# Patient Record
Sex: FEMALE | Race: WHITE
[De-identification: names, ages, dates, MRNs, and addresses within clinical notes are randomized per-mention and may not be internally consistent; named-entity substitution may affect disease eponyms.]

---

## 2018-09-01 ENCOUNTER — HOSPITAL ENCOUNTER (EMERGENCY)
Dept: HOSPITAL 39 - ER | Age: 48
Discharge: HOME | End: 2018-09-01
Payer: COMMERCIAL

## 2018-09-01 VITALS — DIASTOLIC BLOOD PRESSURE: 84 MMHG | OXYGEN SATURATION: 100 % | SYSTOLIC BLOOD PRESSURE: 115 MMHG

## 2018-09-01 VITALS — TEMPERATURE: 96.9 F

## 2018-09-01 DIAGNOSIS — K21.9: ICD-10-CM

## 2018-09-01 DIAGNOSIS — R07.2: Primary | ICD-10-CM

## 2018-09-01 DIAGNOSIS — Z79.82: ICD-10-CM

## 2018-09-01 PROCEDURE — 85379 FIBRIN DEGRADATION QUANT: CPT

## 2018-09-01 PROCEDURE — 82150 ASSAY OF AMYLASE: CPT

## 2018-09-01 PROCEDURE — 83690 ASSAY OF LIPASE: CPT

## 2018-09-01 PROCEDURE — 85730 THROMBOPLASTIN TIME PARTIAL: CPT

## 2018-09-01 PROCEDURE — 83735 ASSAY OF MAGNESIUM: CPT

## 2018-09-01 PROCEDURE — 84443 ASSAY THYROID STIM HORMONE: CPT

## 2018-09-01 PROCEDURE — 80053 COMPREHEN METABOLIC PANEL: CPT

## 2018-09-01 PROCEDURE — 85025 COMPLETE CBC W/AUTO DIFF WBC: CPT

## 2018-09-01 PROCEDURE — 82550 ASSAY OF CK (CPK): CPT

## 2018-09-01 PROCEDURE — 83880 ASSAY OF NATRIURETIC PEPTIDE: CPT

## 2018-09-01 PROCEDURE — 85610 PROTHROMBIN TIME: CPT

## 2018-09-01 PROCEDURE — 93005 ELECTROCARDIOGRAM TRACING: CPT

## 2018-09-01 PROCEDURE — 82553 CREATINE MB FRACTION: CPT

## 2018-09-01 PROCEDURE — 74019 RADEX ABDOMEN 2 VIEWS: CPT

## 2018-09-01 PROCEDURE — 84484 ASSAY OF TROPONIN QUANT: CPT

## 2018-09-01 PROCEDURE — 81001 URINALYSIS AUTO W/SCOPE: CPT

## 2018-09-01 NOTE — ED.PDOC
History of Present Illness





- General


Source: patient


Exam Limitations: no limitations





- History of Present Illness


Initial Comments: 





the patient is a 48-year-old  female presenting to the emergency room 

secondary to intermittent substernal chest pain that is more burning in nature.

  The patient does have a history of significant gastroesophageal reflux 

disease with apparently gastritis and esophagitis as diagnosed by recent upper 

endoscopy.  She had gone off of her Protonix for about a week when she started 

having some substernal chest pain.  Chest pain has been intermittent.  She says 

that sometimes she thinks it is brought on by activity but other times not.  No 

real nausea or vomiting.  No syncope.  Sometimes she thinks she has some 

palpitations.  No history of any heart problems.  No new medications.  She does 

take a daily Excedrin.  Symptoms got worse tonight as she was going to the 

football game.


Timing/Duration: unsure, intermittent


Severity: moderate


Improving Factors: nothing


Worsening Factors: nothing


Associated Symptoms: chest pain, malaise





<Reginald Vila - Last Filed: 09/01/18 06:36>





<Rob Mejia - Last Filed: 09/01/18 09:30>





- General


Chief Complaint: Chest Pain/MI


Stated Complaint: shortness of breath and chest pain


Time Seen by Provider: 09/01/18 03:24





- History of Present Illness


Allergies/Adverse Reactions: 


Allergies





NO KNOWN ALLERGY Allergy (Verified 09/01/18 03:35)


 








Home Medications: 


Ambulatory Orders





Aspirin [Umer Low Dose] 81 mg PO DAILY #1 bottle 09/01/18 


Nitroglycerin 0.4 mg Tab [Nitrostat] 1 ea SL .Q5M PRN #1 bottle 09/01/18 


Pantoprazole Sodium 40 mg PO DAILY 09/01/18 











Review of Systems





- Review of Systems


Constitutional: States: no symptoms reported


EENTM: States: no symptoms reported


Respiratory: States: short of breath - mild subjective


Cardiology: States: chest pain, palpitations


Gastrointestinal/Abdominal: States: see HPI


Genitourinary: States: no symptoms reported


Musculoskeletal: States: no symptoms reported


Skin: States: no symptoms reported


Neurological: States: anxiety


Endocrine: States: no symptoms reported


All other Systems: No Change from Baseline





<Reginald Vila - Last Filed: 09/01/18 06:36>





Past Medical History (General)





- Patient Medical History


Hx Asthma: Yes


Hx Gastroesophageal Reflux: Yes


Surgical History: Hysterectomy, other





- Vaccination History


Hx Tetanus, Diphtheria Vaccination: Yes


Hx Influenza Vaccination: No


Hx Pneumococcal Vaccination: No





- Social History


Hx Tobacco Use: No


Hx Alcohol Use: Yes - occ


Hx Substance Use: No


Hx Substance Use Treatment: No


Hx Depression: No





- Female History


Patient is a Female of Child Bearing Age (10 -59 yrs old): Yes


Patient Pregnant: No





<Reginald Vila L - Last Filed: 09/01/18 06:36>





- Patient Medical History


Surgical History: other - colonoscopy





<Andrew Mejiao R - Last Filed: 09/01/18 09:30>





Family Medical History





- Family History


  ** Mother


Family History: Unknown





<Reginald Vila L - Last Filed: 09/01/18 06:36>





- Family History


  ** Mother


Hx Family;Other: colon polyp--dad





<Rob Mejia R - Last Filed: 09/01/18 09:30>





Physical Exam





- Physical Exam


General Appearance: Alert, Anxious, No apparent distress


Eye Exam: bilateral normal


Ears, Nose, Throat: hearing grossly normal, normal ENT inspection, normal 

pharynx


Neck: non-tender, full range of motion, supple


Respiratory: lungs clear, normal breath sounds, no respiratory distress, no 

accessory muscle use


Cardiovascular/Chest: normal peripheral pulses, regular rate, rhythm, no edema


Peripheral Pulses: radial,right: 2+, radial,left: 2+, dorsalis pedis,right: 2+, 

dorsalis pedis,left: 2+


Gastrointestinal/Abdominal: non tender, soft


Rectal Exam: deferred


Back Exam: normal inspection, no CVA tenderness


Extremity: normal range of motion, non-tender, normal inspection, no pedal edema

, normal capillary refill


Neurologic: CNs II-XII nml as tested, no motor/sensory deficits, alert, 

oriented x 3


Skin Exam: normal color


Comments: 





 Vital Signs - 24 hr











  09/01/18 09/01/18 09/01/18





  03:24 03:33 04:24


 


Temperature 96.9 F L  96.9 F L


 


Pulse Rate [ 83 100 H 75





left]   


 


Respiratory 18 16 16





Rate   


 


Blood Pressure 159/90  131/75





[left]   


 


O2 Sat by Pulse 97  99





Oximetry   














  09/01/18 09/01/18





  05:36 06:35


 


Temperature 96.9 F L 


 


Pulse Rate [ 77 81





left]  


 


Respiratory 16 16





Rate  


 


Blood Pressure 120/72 119/73





[left]  


 


O2 Sat by Pulse 92 L 98





Oximetry  














<Reginald Vila MITZY - Last Filed: 09/01/18 06:36>





Progress





- Progress


Progress: 





09/01/18 06:41


the patient is a 48-year-old  female with a known history of gastritis 

and esophagitis as confirmed by recent EGD according to her presenting with 

substernal chest pain that occurs at varying times.  The patient seems to 

respond to GI medications here.  She is resting comfortably currently.  The 

patient will need at least 1 more set of cardiac enzymes and depending on what 

her symptoms do, may require a more extensive rule out or further cardiac risk 

stratification.  she has received an aspirin.  She is not requiring any oxygen.

  She has not required any morphine or any nitroglycerin to this point.  She 

has not received the Lovenox as this may worsen the known gastritis issue.  As 

a 48-year-old female she is not without cardiac risk and certainly symptoms may 

present an atypical fashion and this demographic.  Continue monitoring and 

obtain repeat cardiac enzymes.  Management will be assumed by oncoming 

physician.





- Results/Orders


Results/Orders: 





chest x-ray shows no acute abnormalities.  Abdominal x-ray shows no acute 

abnormalities. 





09/01/18 03:32


Telemetry .CONTINUOUS normal sinus rhythm





09/01/18 03:45


EKG STAT normal sinus rhythm at 78 bpm.  Normal axis.  No acute ST or T-wave 

changes concerning for current ischemia.  Mild slow R-wave progression in 

anterior leads.  Normal QT corrected interval.








 Laboratory Results - last 24 hr











  09/01/18 09/01/18 09/01/18





  03:33 03:33 03:33


 


WBC   7.7 


 


RBC   4.43 


 


Hgb   13.3 


 


Hct   39.3 


 


MCV   88.8 


 


MCH   30.0 


 


MCHC   33.8 


 


RDW   13.7 


 


Plt Count   240 


 


MPV   8.7 


 


Absolute Neuts (auto)   4.50 


 


Absolute Lymphs (auto)   2.50 


 


Absolute Monos (auto)   0.60 


 


Absolute Eos (auto)   0.10 


 


Absolute Basos (auto)   0.00 


 


Neutrophils %   58.0 


 


Lymphocytes %   32.2 


 


Monocytes %   8.0 


 


Eosinophils %   1.3 


 


Basophils %   0.5 


 


PT    10.0


 


INR    1.00


 


PTT (SP)    28.3


 


D-Dimer, Quantitative    0.25


 


Sodium  142  


 


Potassium  4.2  


 


Chloride  108  


 


Carbon Dioxide  27  


 


Anion Gap  11.2 L  


 


BUN  14  


 


Creatinine  0.64  


 


BUN/Creatinine Ratio  21.9 H  


 


Random Glucose  104  


 


Serum Osmolality  283.9  


 


Calcium  9.5  


 


Magnesium  2.0  


 


Total Bilirubin  0.2  


 


AST  25  


 


ALT  32  


 


Alkaline Phosphatase  76  


 


Creatine Kinase  63  


 


CK-MB (CK-2)  0.9  


 


CK-MB (CK-2) %  Not Reportable  


 


Troponin I  < 0.02  


 


B-Natriuretic Peptide  < 5.0  


 


Serum Total Protein  7.5  


 


Albumin  4.5  


 


Globulin  3.0  


 


Albumin/Globulin Ratio  1.5  


 


Amylase  39  


 


Lipase  22  


 


TSH  9.01 H  


 


Urine Color   


 


Urine Appearance   


 


Urine pH   


 


Ur Specific Gravity   


 


Urine Protein   


 


Urine Glucose (UA)   


 


Urine Ketones   


 


Urine Blood   


 


Urine Nitrite   


 


Urine Bilirubin   


 


Urine Urobilinogen   


 


Ur Leukocyte Esterase   


 


Urine RBC   


 


Urine WBC   


 


Ur Epithelial Cells   


 


Urine Bacteria   














  09/01/18





  03:59


 


WBC 


 


RBC 


 


Hgb 


 


Hct 


 


MCV 


 


MCH 


 


MCHC 


 


RDW 


 


Plt Count 


 


MPV 


 


Absolute Neuts (auto) 


 


Absolute Lymphs (auto) 


 


Absolute Monos (auto) 


 


Absolute Eos (auto) 


 


Absolute Basos (auto) 


 


Neutrophils % 


 


Lymphocytes % 


 


Monocytes % 


 


Eosinophils % 


 


Basophils % 


 


PT 


 


INR 


 


PTT (SP) 


 


D-Dimer, Quantitative 


 


Sodium 


 


Potassium 


 


Chloride 


 


Carbon Dioxide 


 


Anion Gap 


 


BUN 


 


Creatinine 


 


BUN/Creatinine Ratio 


 


Random Glucose 


 


Serum Osmolality 


 


Calcium 


 


Magnesium 


 


Total Bilirubin 


 


AST 


 


ALT 


 


Alkaline Phosphatase 


 


Creatine Kinase 


 


CK-MB (CK-2) 


 


CK-MB (CK-2) % 


 


Troponin I 


 


B-Natriuretic Peptide 


 


Serum Total Protein 


 


Albumin 


 


Globulin 


 


Albumin/Globulin Ratio 


 


Amylase 


 


Lipase 


 


TSH 


 


Urine Color  Yellow


 


Urine Appearance  Clear


 


Urine pH  6.5


 


Ur Specific Gravity  1.015


 


Urine Protein  Negative


 


Urine Glucose (UA)  Negative


 


Urine Ketones  Negative


 


Urine Blood  Negative


 


Urine Nitrite  Negative


 


Urine Bilirubin  Negative


 


Urine Urobilinogen  0.2


 


Ur Leukocyte Esterase  Negative


 


Urine RBC  0


 


Urine WBC  0-1


 


Ur Epithelial Cells  0-1


 


Urine Bacteria  Rare














<Reginald Vila L - Last Filed: 09/01/18 06:36>





- Progress


Progress: 








09/01/18 07:38


Patient stated had same symptoms 15 y/o ago and had negative exercise stress 

test in Pool.She had been taking protonix on and off recently





- Results/Orders


Results/Orders: 





Discuss test result on the patient cardiac enzymes and EKG not showing 

myocardial injury and recommended hospital obs but shey wants to go home check 

with primary md and see heart specialist next week.She was advised to come back 

if symptoms worsens to continue with home medication but was advised also that 

i will prescribed her nitroglycerin tablet sL and to take baby asa until seen 

by cardiologist.





<JudieMarcella danielRivas R - Last Filed: 09/01/18 09:30>





Departure





<Reginald Vila L - Last Filed: 09/01/18 06:36>





- Departure


Time of Disposition: 09:21


Diet: other - Avoid greasy spicy foodas





<JudieMarcella danielRivas R - Last Filed: 09/01/18 09:30>





- Departure


Clinical Impression: 


Chest pain


Qualifiers:


 Chest pain type: unspecified Qualified Code(s): R07.9 - Chest pain, unspecified





Disposition: Discharge to Home or Self Care


Condition: Fair


Instructions:  DI for Chest Pain


Referrals: 


MARICARMEN TAY [Primary Care Provider] - 1-2 Weeks


Prescriptions: 


Aspirin [Umer Low Dose] 81 mg PO DAILY #1 bottle


Nitroglycerin 0.4 mg Tab [Nitrostat] 1 ea SL .Q5M PRN #1 bottle


 PRN Reason: Chest Pain


Home Medications: 


Ambulatory Orders





Aspirin [Umer Low Dose] 81 mg PO DAILY #1 bottle 09/01/18 


Nitroglycerin 0.4 mg Tab [Nitrostat] 1 ea SL .Q5M PRN #1 bottle 09/01/18 


Pantoprazole Sodium 40 mg PO DAILY 09/01/18 








Additional Instructions: 


Return to Emergency room as needed;Follow up with primary Md 04 September 2018 

for referral to cardiologist

## 2018-09-01 NOTE — RAD
PROCEDURE: Abdomen Series



HISTORY: chest pain intermittent for 2 days



COMPARISON: None .



TECHNIQUE:



 Two views of the abdomen and pelvis and one view of the chest,

all projections in the frontal projection.



FINDINGS: 



There are no discrete airspace infiltrates, pneumothoraces or

pleural effusions.



The pulmonary vascularity is normal.



The cardiomediastinal silhouette is within normal limits, for

patient's age and sex.



The bowel gas pattern does not show any evidence of obstruction,

ileus, or bowel wall thickening.



There is no gross evidence of free air in the abdomen or the

pelvis.



There is no visualization of radiopaque calculi in the outline of

the urinary tract.



There is mild-to-moderate constipation .



IMPRESSION:



 There is mild-to-moderate constipation.



There are no acute findings in the chest   



Location of Interpretation: 

[id number]



Electronically signed by:  Charlie Encarnacion MD  9/1/2018 4:05 AM CDT

Workstation: [id number]

## 2020-04-01 ENCOUNTER — HOSPITAL ENCOUNTER (EMERGENCY)
Dept: HOSPITAL 39 - ER | Age: 50
Discharge: HOME | End: 2020-04-01
Payer: COMMERCIAL

## 2020-04-01 VITALS — TEMPERATURE: 98.2 F | SYSTOLIC BLOOD PRESSURE: 134 MMHG | OXYGEN SATURATION: 96 % | DIASTOLIC BLOOD PRESSURE: 100 MMHG

## 2020-04-01 DIAGNOSIS — R05: ICD-10-CM

## 2020-04-01 DIAGNOSIS — Z79.82: ICD-10-CM

## 2020-04-01 DIAGNOSIS — K21.9: ICD-10-CM

## 2020-04-01 DIAGNOSIS — J45.909: ICD-10-CM

## 2020-04-01 DIAGNOSIS — R07.89: Primary | ICD-10-CM

## 2020-04-01 DIAGNOSIS — R06.02: ICD-10-CM

## 2020-04-01 DIAGNOSIS — Z79.899: ICD-10-CM

## 2020-04-01 NOTE — ED.PDOC
History of Present Illness





- General


Chief Complaint: Chest Pain/MI


Stated Complaint: chest pain


Time Seen by Provider: 04/01/20 15:50


Source: patient, RN notes reviewed, Vital Signs reviewed, old records


Exam Limitations: no limitations





- History of Present Illness


Initial Comments: 





Pt is a 50 yo female that presents to ED for 1.5 hour h/o chest tightness, 

palpitations and feeling short of breath after inhaling household chemicals 

while cleaning.  Pt states she was cleaning the house with Lysol and bleach and 

thinks she inhaled some of the fumes.  Her symptoms improved with hgoing outside

and denies any chest tightness or palpitations upon arrival to ED.  Pt also 

states she was in [place] and [place] from March 11-14 and was concerned she 

had COVID 19.  She had a COVID test in [place] on March 14th that was negative.  

She has had mild cough for the past 2 weeks and was seen in [facility] this 

morning and states she had blood work and CXR that were normal.  Denies recent 

fever, NVD.  Denies any h/o cardiac problems.


Allergies/Adverse Reactions: 


Allergies





NO KNOWN ALLERGY Allergy (Verified 09/01/18 03:35)


   





Home Medications: 


Ambulatory Orders





Aspirin [Umer Low Dose] 81 mg PO DAILY #1 bottle 09/01/18 


Pantoprazole Sodium 40 mg PO DAILY 09/01/18 











Review of Systems





- Review of Systems


Constitutional: Denies: chills, fever, weakness


EENTM: Denies: nose congestion, throat pain


Respiratory: States: cough, short of breath.  Denies: wheezing


Cardiology: States: palpitations, other - chest tightness.  Denies: edema, 

syncope


Gastrointestinal/Abdominal: Denies: abdominal pain, diarrhea, nausea, vomiting


Genitourinary: Denies: dysuria, frequency


Musculoskeletal: Denies: back pain, neck pain


Skin: States: no symptoms reported


Neurological: States: no symptoms reported


All other Systems: Reviewed and Negative





Past Medical History (General)





- Patient Medical History


Hx Asthma: Yes


Hx Gastroesophageal Reflux: Yes





- Vaccination History


Hx Tetanus, Diphtheria Vaccination: Yes


Hx Influenza Vaccination: No


Hx Pneumococcal Vaccination: No





- Social History


Hx Tobacco Use: No


Hx Alcohol Use: Yes - occ


Hx Substance Use: No


Hx Substance Use Treatment: No


Hx Depression: No





- Female History


Patient Pregnant: No





Family Medical History





- Family History


  ** Mother


Family History: Unknown


Hx Family;Other: colon polyp--dad





Physical Exam





- Physical Exam


General Appearance: Alert, Anxious, No apparent distress


Eyes, Ears, Nose, Throat Exam: pharynx normal


Neck: non-tender, full range of motion, supple


Respiratory: chest non-tender, lungs clear, normal breath sounds, no respiratory

distress, no accessory muscle use


Cardiovascular/Chest: regular rate, rhythm, no edema, no murmur


Gastrointestinal/Abdominal: non tender, soft, no pulsatile mass


Extremity: normal range of motion, non-tender, normal inspection, no calf 

tenderness


Neurologic: no motor/sensory deficits, alert, normal mood/affect


Skin Exam: normal color, warm/dry





Progress





- Progress


Progress: 





04/01/20 16:11


Pt presents to ED for chest tightness, palpitations and feeling anxious after 

cleaning with household .  Symptoms resolved PTA to ED.  EKG 

unremarkable.  Will get CXR, troponin and D dimer for further evaluation.





04/01/20 16:39


CXR, VS and labs are reassuring.  She has had no pain, difficulty breathing in 

ED.  O2 sat is 100% on RA.  D/W pt possible chemical pneumonitis vs anxiety vs 

other.  No sign of ACS, PE, dissection at this time.  Pt feels comfortable going

home and will f/u with her PCP in 1-2 days for continued evaluation.  SRP given.





- Results/Orders


Results/Orders: 





CHEST XRAY


EXAM DESCRIPTION: Chest,1 View  CLINICAL HISTORY: 49 years Female, CP/SOB  

COMPARISON: September 1, 2018  FINDINGS: One view/radiograph  Heart size and 

pulmonary vessels are within normal limits.  There is no pneumothorax or pleural

effusion. The lungs are clear bilaterally.  The soft tissues are unremarkable. 

No acute osseous findings.  IMPRESSION:  No acute cardiopulmonary abnormality.





                                        





04/01/20 15:57


IV:Start .ONCE 





04/01/20 15:58


EKG Assessment ONCE 





04/01/20 16:00


EKG .ONCE 








                         Laboratory Results - last 24 hr











  04/01/20 04/01/20 04/01/20





  16:00 16:00 16:00


 


WBC  7.6  


 


RBC  4.52  


 


Hgb  13.5  


 


Hct  40.6  


 


MCV  89.9  


 


MCH  29.9  


 


MCHC  33.3  


 


RDW  13.6  


 


Plt Count  256  


 


MPV  8.7  


 


Absolute Neuts (auto)  5.40  


 


Absolute Lymphs (auto)  1.80  


 


Absolute Monos (auto)  0.40  


 


Absolute Eos (auto)  0.00  


 


Absolute Basos (auto)  0.00  


 


Neutrophils %  70.6  


 


Lymphocytes %  23.2  


 


Monocytes %  5.0  


 


Eosinophils %  0.6 L  


 


Basophils %  0.6  


 


D-Dimer, Quantitative   


 


Sodium   138 


 


Potassium   3.4 L 


 


Chloride   104 


 


Carbon Dioxide   23 


 


Anion Gap   14.4 


 


BUN   19 H 


 


Creatinine   0.86 


 


BUN/Creatinine Ratio   22.1 H 


 


Random Glucose   158 H 


 


Serum Osmolality   281.2 


 


Calcium   9.4 


 


Total Bilirubin   0.4 


 


AST   9 L 


 


ALT   40 


 


Alkaline Phosphatase   76 


 


Troponin I    < 0.02


 


B-Natriuretic Peptide   8.5 


 


Serum Total Protein   8.0 


 


Albumin   4.4 


 


Globulin   3.6 H 


 


Albumin/Globulin Ratio   1.2 














  04/01/20





  16:00


 


WBC 


 


RBC 


 


Hgb 


 


Hct 


 


MCV 


 


MCH 


 


MCHC 


 


RDW 


 


Plt Count 


 


MPV 


 


Absolute Neuts (auto) 


 


Absolute Lymphs (auto) 


 


Absolute Monos (auto) 


 


Absolute Eos (auto) 


 


Absolute Basos (auto) 


 


Neutrophils % 


 


Lymphocytes % 


 


Monocytes % 


 


Eosinophils % 


 


Basophils % 


 


D-Dimer, Quantitative  < 131 L


 


Sodium 


 


Potassium 


 


Chloride 


 


Carbon Dioxide 


 


Anion Gap 


 


BUN 


 


Creatinine 


 


BUN/Creatinine Ratio 


 


Random Glucose 


 


Serum Osmolality 


 


Calcium 


 


Total Bilirubin 


 


AST 


 


ALT 


 


Alkaline Phosphatase 


 


Troponin I 


 


B-Natriuretic Peptide 


 


Serum Total Protein 


 


Albumin 


 


Globulin 


 


Albumin/Globulin Ratio 














- EKG/XRAY/CT


EKG: Sinus


Comments: NSR, rate 91, nml intervals, no ST abnormality





Departure





- Departure


Clinical Impression: 


 Atypical chest pain





Dyspnea


Qualifiers:


 Dyspnea type: shortness of breath Qualified Code(s): R06.02 - Shortness of 

breath; R06.00 - Dyspnea, unspecified; R06.01 - Orthopnea





Time of Disposition: 16:39


Disposition: Discharge to Home or Self Care


Condition: Good


Departure Forms:  ED Discharge - Pt. Copy, Patient Portal Self Enrollment


Instructions:  DI for Chest Pain


Diet: resume usual diet


Activity: increase activity as tolerated


Referrals: 


MARICARMEN TAY [Primary Care Provider] - 1-2 Days


Home Medications: 


Ambulatory Orders





Aspirin [Umer Low Dose] 81 mg PO DAILY #1 bottle 09/01/18 


Pantoprazole Sodium 40 mg PO DAILY 09/01/18

## 2020-04-01 NOTE — RAD
EXAM DESCRIPTION: Chest,1 View



CLINICAL HISTORY: 49 years Female, CP/SOB



COMPARISON: September 1, 2018



FINDINGS: One view/radiograph



Heart size and pulmonary vessels are within normal limits. 



There is no pneumothorax or pleural effusion. The lungs are clear

bilaterally. 



The soft tissues are unremarkable. No acute osseous findings.



IMPRESSION: 



No acute cardiopulmonary abnormality. 



Electronically signed by:  Rgegie Pierce MD  4/1/2020 4:20 PM

CDT Workstation: [id number]

## 2020-10-28 ENCOUNTER — HOSPITAL ENCOUNTER (EMERGENCY)
Dept: HOSPITAL 39 - ER | Age: 50
Discharge: HOME | End: 2020-10-28
Payer: SELF-PAY

## 2020-10-28 VITALS — DIASTOLIC BLOOD PRESSURE: 96 MMHG | SYSTOLIC BLOOD PRESSURE: 138 MMHG

## 2020-10-28 VITALS — OXYGEN SATURATION: 99 %

## 2020-10-28 VITALS — TEMPERATURE: 97.9 F

## 2020-10-28 DIAGNOSIS — U07.1: Primary | ICD-10-CM

## 2020-10-28 DIAGNOSIS — R00.0: ICD-10-CM

## 2020-10-28 DIAGNOSIS — R09.1: ICD-10-CM

## 2020-10-28 DIAGNOSIS — Z79.899: ICD-10-CM

## 2020-10-28 DIAGNOSIS — J45.909: ICD-10-CM

## 2020-10-28 DIAGNOSIS — K21.9: ICD-10-CM

## 2020-10-28 DIAGNOSIS — Z79.82: ICD-10-CM

## 2020-10-28 DIAGNOSIS — R07.89: ICD-10-CM

## 2020-10-28 PROCEDURE — 84703 CHORIONIC GONADOTROPIN ASSAY: CPT

## 2020-10-28 PROCEDURE — 36415 COLL VENOUS BLD VENIPUNCTURE: CPT

## 2020-10-28 PROCEDURE — 83880 ASSAY OF NATRIURETIC PEPTIDE: CPT

## 2020-10-28 PROCEDURE — 93005 ELECTROCARDIOGRAM TRACING: CPT

## 2020-10-28 PROCEDURE — 84484 ASSAY OF TROPONIN QUANT: CPT

## 2020-10-28 PROCEDURE — 71275 CT ANGIOGRAPHY CHEST: CPT

## 2020-10-28 PROCEDURE — 85025 COMPLETE CBC W/AUTO DIFF WBC: CPT

## 2020-10-28 PROCEDURE — 80053 COMPREHEN METABOLIC PANEL: CPT

## 2020-10-28 NOTE — ED.PDOC
History of Present Illness





- General


Chief Complaint: Chest Pain/MI


Stated Complaint: chest pain


Time Seen by Provider: 10/28/20 17:35


Source: patient, RN notes reviewed, Vital Signs reviewed


Exam Limitations: no limitations





- History of Present Illness


Initial Comments: 





Patient is a 50-year-old female who presents to ED with right-sided chest pain, 

elevated heart rate and feeling short of breath.  States she was diagnosed with 

COVID-19 6 days ago and has had mild cough and fatigue with no other symptoms.  

For the past 2 days she has noticed that whenever she stands up her heart rate 

goes up to 120.  She has also noticed a daily right-sided chest pain and feeling

short of breath.  She denies headache, nausea, vomiting, diarrhea, abdominal 

pain or productive cough.


Allergies/Adverse Reactions: 


Allergies





NO KNOWN ALLERGY Allergy (Verified 10/28/20 18:01)


   





Home Medications: 


Ambulatory Orders





Aspirin [Umer Low Dose] 81 mg PO DAILY #1 bottle 09/01/18 


Pantoprazole Sodium 40 mg PO DAILY 09/01/18 


Prednisone 60 mg PO DAILY 5 Days #15 tab 10/28/20 











Review of Systems





- Review of Systems


Constitutional: Denies: chills, fever


EENTM: Denies: blurred vision, ear pain, throat pain


Respiratory: States: cough, short of breath


Cardiology: States: chest pain, palpitations.  Denies: edema, syncope


Gastrointestinal/Abdominal: Denies: abdominal pain, diarrhea, nausea, vomiting


Genitourinary: Denies: dysuria, frequency, hematuria


Skin: Denies: rash


Neurological: Denies: headache, paresthesia


All other Systems: Reviewed and Negative





Past Medical History (General)





- Patient Medical History


Hx Seizures: No


Hx Stroke: No


Hx Asthma: Yes


Hx of COPD: No


Hx Cardiac Disorders: No


Hx Congestive Heart Failure: No


Hx Hypertension: No


Hx Thyroid Disease: No


Hx Diabetes: No


Hx Gastroesophageal Reflux: Yes


Hx Renal Disease: No


Hx Cancer: No





- Vaccination History


Hx Tetanus, Diphtheria Vaccination: Yes


Hx Influenza Vaccination: No


Hx Pneumococcal Vaccination: No





- Social History


Hx Tobacco Use: No


Hx Alcohol Use: Yes - occ


Hx Substance Use: No


Hx Substance Use Treatment: No


Hx Depression: No





- Female History


Patient Pregnant: No





Family Medical History





- Family History


  ** Mother


Family History: Unknown


Hx Family;Other: colon polyp--dad





Physical Exam





- Physical Exam


General Appearance: Alert, Comfortable, No apparent distress


Neck: non-tender, full range of motion, supple


Respiratory: chest non-tender, lungs clear, normal breath sounds, no respiratory

distress, no accessory muscle use


Cardiovascular/Chest: normal peripheral pulses, regular rate, rhythm, no murmur


Gastrointestinal/Abdominal: non tender, soft, no pulsatile mass


Extremity: normal range of motion, no pedal edema, no calf tenderness


Neurologic: no motor/sensory deficits, alert, normal mood/affect


Skin Exam: normal color, warm/dry





Progress





- Progress


Progress: 





10/28/20 17:47


Patient had recent diagnosis of COVID-19.  She presents today with elevated 

heart rate, right-sided chest pain and feeling short of breath.  EKG is 

unremarkable.  Will get CTA of the chest to rule out PE.





10/28/20 19:09


Discussed with patient lab and imaging results.  Her vital signs have been 

stable with no hypoxia or respiratory distress.  No sign of acute coronary 

syndrome, pulmonary embolism or pneumonia at this time.  Will treat with 

prednisone for possible pleurisy due to COVID-19 and I have asked her to follow-

up with her PCP in 1 to 2 days for recheck.  Strict return precautions given.





- Results/Orders


Results/Orders: 





EKG-  sinus tachycardia, rate 101, nml intervals, no ST abnormality








CTA CHEST


FINDINGS: Pulmonary arteries: No abnormality noted. No pulmonary embolism. 

Aorta: No acute change noted. No thoracic aortic aneurysm. Lungs: No abnormality

noted. No mass. No consolidation. Pleural space: No abnormality noted. No 

significant effusion. No pneumothorax. Heart: No abnormality noted. No 

cardiomegaly. No significant pericardial effusion. No evidence of RV 

dysfunction. Bones/joints: No acute fracture. No dislocation. Soft tissues: No 

abnormality noted. Lymph nodes: No abnormality noted. No enlarged lymph nodes. 

Liver: Fatty liver.  IMPRESSION: No pulmonary embolus noted.





                                        





10/28/20 17:42


IV:Start .ONCE 





10/28/20 17:45


EKG .ONCE 








                         Laboratory Results - last 24 hr











  10/28/20 10/28/20 10/28/20





  17:50 17:50 17:50


 


WBC  5.3  


 


RBC  4.15 L  


 


Hgb  12.2  


 


Hct  36.3  


 


MCV  87.3  


 


MCH  29.5  


 


MCHC  33.8  


 


RDW  13.9  


 


Plt Count  134  


 


MPV  9.5  


 


Absolute Neuts (auto)  1.50 L  


 


Absolute Lymphs (auto)  3.30  


 


Absolute Monos (auto)  0.40  


 


Absolute Eos (auto)  0.00  


 


Absolute Basos (auto)  0.00  


 


Neutrophils %  28.0 L  


 


Lymphocytes %  63.5 H  


 


Monocytes %  8.0  


 


Eosinophils %  0.2 L  


 


Basophils %  0.3  


 


Sodium   138 


 


Potassium   3.9 


 


Chloride   103 


 


Carbon Dioxide   24 


 


Anion Gap   14.9 


 


BUN   16 


 


Creatinine   0.72 


 


BUN/Creatinine Ratio   22.2 H 


 


Random Glucose   107 H 


 


Serum Osmolality   277.3 


 


Calcium   8.5 


 


Total Bilirubin   0.6 


 


AST   51 H 


 


ALT   93 H 


 


Alkaline Phosphatase   103 


 


Troponin I    < 0.02


 


B-Natriuretic Peptide   < 15.0 


 


Serum Total Protein   7.4 


 


Albumin   4.0 


 


Globulin   3.4 


 


Albumin/Globulin Ratio   1.2 


 


Serum HCG, Qual   














  10/28/20





  17:50


 


WBC 


 


RBC 


 


Hgb 


 


Hct 


 


MCV 


 


MCH 


 


MCHC 


 


RDW 


 


Plt Count 


 


MPV 


 


Absolute Neuts (auto) 


 


Absolute Lymphs (auto) 


 


Absolute Monos (auto) 


 


Absolute Eos (auto) 


 


Absolute Basos (auto) 


 


Neutrophils % 


 


Lymphocytes % 


 


Monocytes % 


 


Eosinophils % 


 


Basophils % 


 


Sodium 


 


Potassium 


 


Chloride 


 


Carbon Dioxide 


 


Anion Gap 


 


BUN 


 


Creatinine 


 


BUN/Creatinine Ratio 


 


Random Glucose 


 


Serum Osmolality 


 


Calcium 


 


Total Bilirubin 


 


AST 


 


ALT 


 


Alkaline Phosphatase 


 


Troponin I 


 


B-Natriuretic Peptide 


 


Serum Total Protein 


 


Albumin 


 


Globulin 


 


Albumin/Globulin Ratio 


 


Serum HCG, Qual  Negative














Departure





- Departure


Clinical Impression: 


 COVID-19, Atypical chest pain, Pleurisy





Time of Disposition: 19:11


Disposition: Discharge to Home or Self Care


Condition: Good


Departure Forms:  ED Discharge - Pt. Copy, Patient Portal Self Enrollment


Instructions:  DI for Chest Pain, Pleuritic Chest Pain (DC)


Diet: resume usual diet


Activity: increase activity as tolerated


Referrals: 


MARICARMEN TAY [Primary Care Provider] - 1-2 Days


Prescriptions: 


Prednisone 60 mg PO DAILY 5 Days #15 tab


Home Medications: 


Ambulatory Orders





Aspirin [Umer Low Dose] 81 mg PO DAILY #1 bottle 09/01/18 


Pantoprazole Sodium 40 mg PO DAILY 09/01/18 


Prednisone 60 mg PO DAILY 5 Days #15 tab 10/28/20